# Patient Record
Sex: FEMALE | URBAN - METROPOLITAN AREA
[De-identification: names, ages, dates, MRNs, and addresses within clinical notes are randomized per-mention and may not be internally consistent; named-entity substitution may affect disease eponyms.]

---

## 2021-12-20 ENCOUNTER — LAB OUTSIDE AN ENCOUNTER (OUTPATIENT)
Dept: URBAN - METROPOLITAN AREA CLINIC 33 | Facility: CLINIC | Age: 41
End: 2021-12-20

## 2021-12-20 ENCOUNTER — OFFICE VISIT (OUTPATIENT)
Dept: URBAN - METROPOLITAN AREA CLINIC 33 | Facility: CLINIC | Age: 41
End: 2021-12-20
Payer: COMMERCIAL

## 2021-12-20 VITALS — BODY MASS INDEX: 30.21 KG/M2 | WEIGHT: 160 LBS | HEART RATE: 83 BPM | OXYGEN SATURATION: 100 % | HEIGHT: 61 IN

## 2021-12-20 DIAGNOSIS — R14.0 BLOATING: ICD-10-CM

## 2021-12-20 DIAGNOSIS — R14.2 BURPING: ICD-10-CM

## 2021-12-20 DIAGNOSIS — A04.8 H. PYLORI INFECTION: ICD-10-CM

## 2021-12-20 PROCEDURE — 99243 OFF/OP CNSLTJ NEW/EST LOW 30: CPT | Performed by: INTERNAL MEDICINE

## 2021-12-20 PROCEDURE — 99203 OFFICE O/P NEW LOW 30 MIN: CPT | Performed by: INTERNAL MEDICINE

## 2021-12-20 RX ORDER — SUCRALFATE 1 G/1
1 TABLET ON AN EMPTY STOMACH TABLET ORAL TWICE A DAY
Status: ACTIVE | COMMUNITY

## 2021-12-20 RX ORDER — PANTOPRAZOLE SODIUM 40 MG/1
1 TABLET TABLET, DELAYED RELEASE ORAL ONCE A DAY
Status: ACTIVE | COMMUNITY

## 2021-12-20 RX ORDER — LEVOTHYROXINE SODIUM 125 UG/1
1 TABLET IN THE MORNING ON AN EMPTY STOMACH TABLET ORAL ONCE A DAY
Status: ACTIVE | COMMUNITY

## 2021-12-20 NOTE — HPI-BLOATING/GAS
41 year old patient complains of bloating. Patient admits symptoms have been present since she returned from [place] in October .   She admits visiting PCP for eval of symptoms. She admits being tested at that time for H.Pylori via Blood test  which returned positive. She was started on triple therapy Abx course which she admits completed 4-5 weeks ago. She denies repeat test to check if bacteria was eradicated . She admits she is currently taking Pantoprazole and sucralfate which she is not finding true relief. Also pt admits taking PPI and Synthroid in morning .  Patient describes symptoms as continuous belching intermittently throughout the day and once it starts its continuos.    Patient denies having any previous breath test including H. Pylori or SIBO ..

## 2022-01-18 ENCOUNTER — OFFICE VISIT (OUTPATIENT)
Dept: URBAN - METROPOLITAN AREA SURGERY CENTER 8 | Facility: SURGERY CENTER | Age: 42
End: 2022-01-18

## 2022-01-27 ENCOUNTER — OFFICE VISIT (OUTPATIENT)
Dept: URBAN - METROPOLITAN AREA SURGERY CENTER 8 | Facility: SURGERY CENTER | Age: 42
End: 2022-01-27

## 2022-02-07 ENCOUNTER — OFFICE VISIT (OUTPATIENT)
Dept: URBAN - METROPOLITAN AREA CLINIC 33 | Facility: CLINIC | Age: 42
End: 2022-02-07

## 2022-02-14 ENCOUNTER — OFFICE VISIT (OUTPATIENT)
Dept: URBAN - METROPOLITAN AREA CLINIC 33 | Facility: CLINIC | Age: 42
End: 2022-02-14

## 2022-02-22 ENCOUNTER — CLAIMS CREATED FROM THE CLAIM WINDOW (OUTPATIENT)
Dept: URBAN - METROPOLITAN AREA SURGERY CENTER 8 | Facility: SURGERY CENTER | Age: 42
End: 2022-02-22

## 2022-02-22 ENCOUNTER — CLAIMS CREATED FROM THE CLAIM WINDOW (OUTPATIENT)
Dept: URBAN - METROPOLITAN AREA SURGERY CENTER 8 | Facility: SURGERY CENTER | Age: 42
End: 2022-02-22
Payer: COMMERCIAL

## 2022-02-22 DIAGNOSIS — B96.81 BACTERIAL INFECTION DUE TO H. PYLORI: ICD-10-CM

## 2022-02-22 DIAGNOSIS — K22.89 ESOPHAGEAL BLEEDING: ICD-10-CM

## 2022-02-22 DIAGNOSIS — K29.60 ADENOPAPILLOMATOSIS GASTRICA: ICD-10-CM

## 2022-02-22 DIAGNOSIS — K29.80 ACUTE DUODENITIS: ICD-10-CM

## 2022-02-22 PROCEDURE — 43239 EGD BIOPSY SINGLE/MULTIPLE: CPT | Performed by: INTERNAL MEDICINE

## 2022-02-22 PROCEDURE — G8907 PT DOC NO EVENTS ON DISCHARG: HCPCS | Performed by: INTERNAL MEDICINE

## 2022-03-03 ENCOUNTER — WEB ENCOUNTER (OUTPATIENT)
Dept: URBAN - METROPOLITAN AREA CLINIC 33 | Facility: CLINIC | Age: 42
End: 2022-03-03

## 2022-03-09 ENCOUNTER — OFFICE VISIT (OUTPATIENT)
Dept: URBAN - METROPOLITAN AREA CLINIC 33 | Facility: CLINIC | Age: 42
End: 2022-03-09
Payer: COMMERCIAL

## 2022-03-09 VITALS
SYSTOLIC BLOOD PRESSURE: 100 MMHG | HEART RATE: 94 BPM | WEIGHT: 160 LBS | HEIGHT: 61 IN | BODY MASS INDEX: 30.21 KG/M2 | DIASTOLIC BLOOD PRESSURE: 64 MMHG | OXYGEN SATURATION: 98 %

## 2022-03-09 DIAGNOSIS — K29.70 GASTRITIS, UNSPECIFIED, WITHOUT BLEEDING: ICD-10-CM

## 2022-03-09 DIAGNOSIS — B96.81 HELICOBACTER PYLORI [H. PYLORI] AS THE CAUSE OF DISEASES CLASSIFIED ELSEWHERE: ICD-10-CM

## 2022-03-09 PROBLEM — 6185008: Status: ACTIVE | Noted: 2022-03-09

## 2022-03-09 PROCEDURE — 99214 OFFICE O/P EST MOD 30 MIN: CPT | Performed by: INTERNAL MEDICINE

## 2022-03-09 RX ORDER — PANTOPRAZOLE SODIUM 40 MG/1
1 TABLET TABLET, DELAYED RELEASE ORAL ONCE A DAY
Status: ACTIVE | COMMUNITY

## 2022-03-09 RX ORDER — LEVOTHYROXINE SODIUM 125 UG/1
1 TABLET IN THE MORNING ON AN EMPTY STOMACH TABLET ORAL ONCE A DAY
Status: ACTIVE | COMMUNITY

## 2022-03-09 RX ORDER — SUCRALFATE 1 G/1
1 TABLET ON AN EMPTY STOMACH TABLET ORAL TWICE A DAY
Status: ACTIVE | COMMUNITY

## 2022-03-09 RX ORDER — BISMUTH SUBSALICYLATE 262 MG/1
2 TABLETS TABLET, CHEWABLE ORAL
Qty: 112 TABLET | Refills: 0 | OUTPATIENT
Start: 2022-03-09 | End: 2022-03-23

## 2022-03-09 RX ORDER — TETRACYCLINE HYDROCHLORIDE 500 MG/1
1 CAPSULE CAPSULE ORAL
Qty: 56 CAPSULE | Refills: 0 | OUTPATIENT
Start: 2022-03-09 | End: 2022-03-23

## 2022-03-09 RX ORDER — OMEPRAZOLE 20 MG/1
1 CAPSULE CAPSULE, DELAYED RELEASE ORAL TWICE A DAY
Qty: 28 CAPSULE | Refills: 0 | OUTPATIENT
Start: 2022-03-09

## 2022-03-09 RX ORDER — METRONIDAZOLE 250 MG/1
1 TABLET TABLET ORAL
Qty: 56 TABLET | Refills: 0 | OUTPATIENT
Start: 2022-03-09 | End: 2022-03-23

## 2022-03-09 RX ORDER — BISMUTH SUBCITRATE POTASSIUM, METRONIDAZOLE, TETRACYCLINE HYDROCHLORIDE 140; 125; 125 MG/1; MG/1; MG/1
3 CAPSULES CAPSULE ORAL
Qty: 168 CAPSULE | Refills: 0 | OUTPATIENT

## 2022-03-09 NOTE — HPI-BLOATING/GAS
Patient presents for follow up of bloating /gas along with EGD follow up. Patient had EGD completed on 02/22 with results noted below . Pt denies continuing Pantoprazole or Sucralfate since last visit . She admits symptoms have improved and denies any concerns at this time . She denies complications after procedure .  Patient denies nausea or vomiting .      Of last visit (12/20/2021)41 year old patient complains of bloating. Patient admits symptoms have been present since she returned from [facility] in October .   She admits visiting PCP for eval of symptoms. She admits being tested at that time for H.Pylori via Blood test  which returned positive. She was started on triple therapy Abx course which she admits completed 4-5 weeks ago. She denies repeat test to check if bacteria was eradicated . She admits she is currently taking Pantoprazole and sucralfate which she is not finding true relief. Also pt admits taking PPI and Synthroid in morning .  Patient describes symptoms as continuous belching intermittently throughout the day and once it starts its continuos.    Patient denies having any previous breath test including H. Pylori or SIBO ..

## 2022-03-10 ENCOUNTER — TELEPHONE ENCOUNTER (OUTPATIENT)
Dept: URBAN - METROPOLITAN AREA CLINIC 36 | Facility: CLINIC | Age: 42
End: 2022-03-10

## 2022-03-10 ENCOUNTER — WEB ENCOUNTER (OUTPATIENT)
Dept: URBAN - METROPOLITAN AREA CLINIC 33 | Facility: CLINIC | Age: 42
End: 2022-03-10

## 2022-03-10 RX ORDER — BISMUTH SUBSALICYLATE 262 MG/1
2 TABLETS TABLET, CHEWABLE ORAL
Qty: 112 TABLET | Refills: 0
Start: 2022-03-09 | End: 2022-03-24

## 2022-03-10 RX ORDER — TETRACYCLINE HYDROCHLORIDE 500 MG/1
1 CAPSULE CAPSULE ORAL
Qty: 56 CAPSULE | Refills: 0
Start: 2022-03-09 | End: 2022-03-24

## 2022-03-10 RX ORDER — METRONIDAZOLE 250 MG/1
1 TABLET TABLET ORAL
Qty: 56 TABLET | Refills: 0
Start: 2022-03-09 | End: 2022-03-24

## 2022-04-14 LAB — H. PYLORI STOOL AG, EIA: NEGATIVE

## 2022-04-25 ENCOUNTER — OFFICE VISIT (OUTPATIENT)
Dept: URBAN - METROPOLITAN AREA CLINIC 33 | Facility: CLINIC | Age: 42
End: 2022-04-25

## 2022-10-31 ENCOUNTER — OFFICE VISIT (OUTPATIENT)
Dept: URBAN - METROPOLITAN AREA CLINIC 33 | Facility: CLINIC | Age: 42
End: 2022-10-31

## 2022-11-07 ENCOUNTER — OFFICE VISIT (OUTPATIENT)
Dept: URBAN - METROPOLITAN AREA CLINIC 33 | Facility: CLINIC | Age: 42
End: 2022-11-07
Payer: COMMERCIAL

## 2022-11-07 ENCOUNTER — DASHBOARD ENCOUNTERS (OUTPATIENT)
Age: 42
End: 2022-11-07

## 2022-11-07 ENCOUNTER — LAB OUTSIDE AN ENCOUNTER (OUTPATIENT)
Dept: URBAN - METROPOLITAN AREA CLINIC 33 | Facility: CLINIC | Age: 42
End: 2022-11-07

## 2022-11-07 VITALS
WEIGHT: 162 LBS | OXYGEN SATURATION: 98 % | HEART RATE: 88 BPM | HEIGHT: 61 IN | BODY MASS INDEX: 30.58 KG/M2 | SYSTOLIC BLOOD PRESSURE: 100 MMHG | DIASTOLIC BLOOD PRESSURE: 60 MMHG

## 2022-11-07 DIAGNOSIS — R14.2 BELCHING: ICD-10-CM

## 2022-11-07 DIAGNOSIS — F43.9 STRESS: ICD-10-CM

## 2022-11-07 DIAGNOSIS — R14.0 ABDOMINAL BLOATING: ICD-10-CM

## 2022-11-07 DIAGNOSIS — K29.70 GASTRITIS, UNSPECIFIED, WITHOUT BLEEDING: ICD-10-CM

## 2022-11-07 PROBLEM — 116289008: Status: ACTIVE | Noted: 2022-11-07

## 2022-11-07 PROBLEM — 271834000: Status: ACTIVE | Noted: 2022-11-07

## 2022-11-07 PROBLEM — 73595000: Status: ACTIVE | Noted: 2022-11-07

## 2022-11-07 PROBLEM — 708164002: Status: ACTIVE | Noted: 2022-03-09

## 2022-11-07 PROCEDURE — 99214 OFFICE O/P EST MOD 30 MIN: CPT | Performed by: INTERNAL MEDICINE

## 2022-11-07 RX ORDER — NORETHINDRONE ACETATE AND ETHINYL ESTRADIOL, ETHINYL ESTRADIOL AND FERROUS FUMARATE 1MG-10(24)
1 TABLET KIT ORAL ONCE A DAY
Status: ACTIVE | COMMUNITY

## 2022-11-07 RX ORDER — BISMUTH SUBCITRATE POTASSIUM, METRONIDAZOLE, TETRACYCLINE HYDROCHLORIDE 140; 125; 125 MG/1; MG/1; MG/1
3 CAPSULES CAPSULE ORAL
Qty: 168 CAPSULE | Refills: 0 | COMMUNITY

## 2022-11-07 RX ORDER — OMEPRAZOLE 20 MG/1
1 CAPSULE CAPSULE, DELAYED RELEASE ORAL TWICE A DAY
Qty: 28 CAPSULE | Refills: 0 | Status: ON HOLD | COMMUNITY
Start: 2022-03-09

## 2022-11-07 RX ORDER — AMITRIPTYLINE HYDROCHLORIDE 10 MG/1
1 TABLET AT BEDTIME TABLET, FILM COATED ORAL ONCE A DAY
Qty: 60 TABLET | Refills: 1 | OUTPATIENT
Start: 2022-11-07

## 2022-11-07 RX ORDER — SUCRALFATE 1 G/1
1 TABLET ON AN EMPTY STOMACH TABLET ORAL TWICE A DAY
Status: ACTIVE | COMMUNITY

## 2022-11-07 RX ORDER — PANTOPRAZOLE SODIUM 40 MG/1
1 TABLET TABLET, DELAYED RELEASE ORAL ONCE A DAY
Status: ON HOLD | COMMUNITY

## 2022-11-07 RX ORDER — LEVOTHYROXINE SODIUM 125 UG/1
1 TABLET IN THE MORNING ON AN EMPTY STOMACH TABLET ORAL ONCE A DAY
Status: ACTIVE | COMMUNITY

## 2022-11-07 NOTE — HPI-ABDOMINAL PAIN
Patient presents for abdominal bloating consult. Patient admits she has been experiencing the symptoms for almost a month.  Patient states symptoms are located LLQ.  Patient admits symptoms are more present after 2 to 3 hours after eating. Patient denies nausea or vomiting. Patient denies having any recent imaging.  Patient admits EGD in the past. patient states that her belcing has returned and also irregulare cycles.....no cycle for 2.5 months Patient says she was dx with a cyst on her ovaries a week ago   Lab results LH 16.9 FSH 6.4 Prolactin 23.5 TSH 0.083 Thyroxine 10

## 2022-12-07 ENCOUNTER — OFFICE VISIT (OUTPATIENT)
Dept: URBAN - METROPOLITAN AREA CLINIC 33 | Facility: CLINIC | Age: 42
End: 2022-12-07